# Patient Record
Sex: MALE | Race: WHITE | NOT HISPANIC OR LATINO | Employment: FULL TIME | URBAN - METROPOLITAN AREA
[De-identification: names, ages, dates, MRNs, and addresses within clinical notes are randomized per-mention and may not be internally consistent; named-entity substitution may affect disease eponyms.]

---

## 2021-01-27 ENCOUNTER — IMMUNIZATION (OUTPATIENT)
Dept: NURSING | Facility: CLINIC | Age: 31
End: 2021-01-27
Payer: COMMERCIAL

## 2021-01-27 PROCEDURE — 91300 PR COVID VAC PFIZER DIL RECON 30 MCG/0.3 ML IM: CPT

## 2021-01-27 PROCEDURE — 0001A PR COVID VAC PFIZER DIL RECON 30 MCG/0.3 ML IM: CPT

## 2021-02-17 ENCOUNTER — IMMUNIZATION (OUTPATIENT)
Dept: NURSING | Facility: CLINIC | Age: 31
End: 2021-02-17
Attending: INTERNAL MEDICINE
Payer: COMMERCIAL

## 2021-02-17 PROCEDURE — 91300 PR COVID VAC PFIZER DIL RECON 30 MCG/0.3 ML IM: CPT

## 2021-02-17 PROCEDURE — 0002A PR COVID VAC PFIZER DIL RECON 30 MCG/0.3 ML IM: CPT

## 2021-03-06 ENCOUNTER — HEALTH MAINTENANCE LETTER (OUTPATIENT)
Age: 31
End: 2021-03-06

## 2021-10-09 ENCOUNTER — HEALTH MAINTENANCE LETTER (OUTPATIENT)
Age: 31
End: 2021-10-09

## 2022-03-20 ENCOUNTER — HEALTH MAINTENANCE LETTER (OUTPATIENT)
Age: 32
End: 2022-03-20

## 2022-07-26 ENCOUNTER — LAB (OUTPATIENT)
Dept: LAB | Facility: CLINIC | Age: 32
End: 2022-07-26
Payer: COMMERCIAL

## 2022-07-26 DIAGNOSIS — Z31.440 ENCOUNTER OF MALE FOR TESTING FOR GENETIC DISEASE CARRIER STATUS FOR PROCREATIVE MANAGEMENT: ICD-10-CM

## 2022-07-26 DIAGNOSIS — Z31.440 ENCOUNTER OF MALE FOR TESTING FOR GENETIC DISEASE CARRIER STATUS FOR PROCREATIVE MANAGEMENT: Primary | ICD-10-CM

## 2022-07-26 PROCEDURE — 36415 COLL VENOUS BLD VENIPUNCTURE: CPT

## 2022-07-26 NOTE — PROGRESS NOTES
[facility]  Genetic Counseling Consult    Patient:  Irving Jacob YOB: 1990   Date of Service:  22      Irving accompanied his partner, Afia to her appointment at the [facility] for genetic consultation. The option to pursue carrier screening was discussed today.        Impression/Plan:   1. Irving and Afia elected to pursue expanded carrier screening for 302 conditions through [facility].  Results are expected within 2-4 weeks, and will be available in [facility].  We will contact the couple to discuss the results. They requested that we contact them individually once both results are available and both provided verbal permission for results to be left in their voicemail.     Carrier Screening:   The couple reported that they are of  ancestry:     Cystic fibrosis is an autosomal recessive genetic condition that occurs with increased frequency in individuals of  ancestry and carrier screening for this condition is available.  In addition,  screening in the [facility] includes cystic fibrosis.      Expanded carrier screening for mutations in a large panel of genes associated with autosomal recessive conditions including cystic fibrosis, spinal muscular atrophy, and others, is now available.    We discussed that expanded carrier screening is designed to identify carrier status for conditions that are primarily childhood or adolescent onset. Expanded carrier screening does not evaluate for adult-onset conditions such as hereditary cancer syndromes, dementia/ Alzheimer's disease, or cardiovascular disease risk factors. Additionally, expanded carrier screening is not comprehensive for all known genetic diseases or inherited conditions. This is a screening test, and residual carrier status risk figures will be provided to the patient after results become available. Carrier screening is  not meant to diagnose the patient with a condition, and generally carriers are asymptomatic. However, certain genes may confer increased risks for various health concerns in carriers (DMD, FMR1, etc). Patients are encouraged to share results with their primary care providers to ensure appropriate screening. If we are notified by the performing laboratory of a variant reclassification, the patient will be contacted. We briefly reviewed MARCY (Genetic Information Nondiscrimination Act). MARCY is a federal law that protects individuals from health insurance or employment discrimination based on a genetic test result alone.  There are currently no legal discrimination protections in terms of life insurance, long term care, or disability insurances. We reviewed that this is often less of a concern in terms of carrier screening for conditions that are most often not associated with symptoms when an individual is a carrier, however there are some exceptions.     We reviewed availability of expanded carrier screening through [facility] and different panel sizes. Ayla will report on pseudodeficiency alleles, which are benign variants that are not known to be associated with disease and are not thought to impact the individuals risk to be a carrier for these conditions. However, the presence of pseudodeficiency alleles can exhibit false positive results on biochemical tests such as  screen. [facility] will report the common 5T CFTR allele in isolation.     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 Records of these results were not available for review at time of discussion.        Irving and Afia elected to pursue expanded carrier screening for 302 conditions through [facility]. This includes the three conditions mentioned above given Afia's reported history. We  reviewed benefits and limitations of add-on genes with variable presentation and that if positive, it would be recommended that these are shared with their PCP for appropriate management. Results are expected within 2-4 weeks, and will be available in [facility]. We will contact the couple to discuss the results.     We reviewed the benefits and limitations of this testing.  Screening tests provide a risk assessment specific to the pregnancy for certain fetal chromosome abnormalities, but cannot definitively diagnose or exclude a fetal chromosome abnormality.  Follow-up genetic counseling and consideration of diagnostic testing is recommended with any abnormal screening result.     Diagnostic tests carry inherent risks- including risk of miscarriage- that require careful consideration.  These tests can detect fetal chromosome abnormalities with greater than 99% certainty.  Results can be compromised by maternal cell contamination or mosaicism, and are limited by the resolution of cytogenetic G-banding technology.  There is no screening nor diagnostic test that can detect all forms of birth defects or mental disability.    It was a pleasure to be involved with Irving's care.     Chinyere Cardona MS, [facility]  Licensed Genetic Counselor  [facility]  Maternal Fetal Medicine  Ph: [phone]  [email or web address]             32.8

## 2022-08-05 ENCOUNTER — TELEPHONE (OUTPATIENT)
Dept: MATERNAL FETAL MEDICINE | Facility: CLINIC | Age: 32
End: 2022-08-05

## 2022-08-05 LAB — SCANNED LAB RESULT: ABNORMAL

## 2022-08-05 NOTE — TELEPHONE ENCOUNTER
August 5, 2022    Message to Irving's partner to notify couple of available carrier screening results to determine time when both are together to discuss over the phone as both are carriers for several conditions including one overlapping condition.    Chinyere Cardona MS, [facility]  Licensed Genetic Counselor  [facility]  Maternal Fetal Medicine  Ph: [phone]  [email or web address]

## 2022-08-05 NOTE — TELEPHONE ENCOUNTER
August 5, 2022     I spoke with the couple to review available expanded carrier screening results. Of note, Afia and Irving were BOTH identified to be carriers of hereditary hemochromatosis. They did not overlap on any additional conditions on the panel of 302 conditions through [facility].     Afia was found to be a carrier for the following conditions,    Alpha-1 antitrypsin deficiency SERPINA1 c.1096G>A (p.Hgt800Eid)  This is a highly variable inherited disorder that may cause lung disease and liver disease. Individuals with a single pathogenic variant are typically healthy, but may have a slightly increased risk for lung or liver diseases. Environmental factors, such as smoking, increase the risk of lung disease. Prior to testing, Afia was already aware of her carrier status and this information and had shared that her providers were also aware. Afia s partner was not identified to be a carrier of this condition. This information should be shared with any of their future children s pediatrician/PCP s.    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 MMDs, LGMDs, and DMAT are conditions that cause muscle weakness. Afia s partner was not identified to be a carrier of this condition, reducing the chance for the couple to have an affected child.      Afia and Irving were BOTH identified to be carriers of-  Hereditary hemochromatosis type 1 HFE c.187C>G (p.Hgu48Nnl)  Hereditary hemochromatosis is a condition caused by the body absorbing too much iron leading to excess iron or iron overload. Hemochromatosis due to variants in HFE typically begins in adulthood when iron accumulation progressively develops with age. Males are more likely to have  symptoms at earlier ages due to protective iron loss through menstruation and pregnancy for women. Therefore, symptoms for women are more likely postmenopausal.  Iron overload, if untreated, can lead to diabetes mellitus (due to iron accumulation and damage to the pancreas), hepatomegaly, hepatic cirrhosis, primary liver cancer, and cardiomyopathy. Early treatment such as regular phlebotomy is successful in controlling the accumulation and therefore, preventing or reducing the severity of the symptoms.    The most common genetic variants in HFE are known to have low penetrance, meaning not all individuals with these genetic changes will go on to develop signs or symptoms related to the condition. The specific variant identified in Cesar is a very common, low penetrance variant that is known to contribute to hemochromatosis when present with a second pathogenic allele in HFE and is associated with a lower penetrance than other pathogenic alleles in the HFE gene.   Because Cesar are both carriers of this condition (same variant), there would be a 1/4 or 25% chance for any of the couple s children to inherit both HFE variants. An estimated 1.5% of individuals of  descent who are affected with hemochromatosis are homozygous for this variant, however, penetrance of the homozygous genotype is very low and is associated with variable phenotypes. Genetic testing is not able to clarify or predict phenotype. We reviewed that this information should be shared with any of their future children s pediatrician/PCP s to ensure appropriate evaluation.       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 His partner was not identified to be a carrier of this condition, reducing the chance for the couple to have an affected  child.     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 His partner was not identified to be a carrier of this condition, reducing the chance for the couple to have an affected child.     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 His partner was not identified to be a carrier of this condition, reducing the chance for the couple to have an affected child.     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 His partner was not identified to be a carrier of this condition, reducing the chance for the couple to have an affected child.       The couple's children and siblings have a 50% chance of being carriers for the aforementioned conditions and it is recommended this information be shared with them (besides risk to children for hereditary hemochromatosis). Afia and Irving provided verbal permission for results to be sent to their email. They were encouraged to reach out with any further questions.     Chinyere Cardona MS, [facility]  Licensed Genetic Counselor  [facility]  Maternal Fetal Medicine  Ph: [phone]  [email or web address]

## 2022-09-11 ENCOUNTER — HEALTH MAINTENANCE LETTER (OUTPATIENT)
Age: 32
End: 2022-09-11

## 2023-05-06 ENCOUNTER — HEALTH MAINTENANCE LETTER (OUTPATIENT)
Age: 33
End: 2023-05-06

## 2024-03-06 ENCOUNTER — MEDICAL CORRESPONDENCE (OUTPATIENT)
Dept: SCHEDULING | Facility: CLINIC | Age: 34
End: 2024-03-06
Payer: COMMERCIAL

## 2024-04-21 ENCOUNTER — OFFICE VISIT (OUTPATIENT)
Dept: FAMILY MEDICINE | Facility: CLINIC | Age: 34
End: 2024-04-21
Payer: COMMERCIAL

## 2024-04-21 VITALS
SYSTOLIC BLOOD PRESSURE: 122 MMHG | HEIGHT: 70 IN | OXYGEN SATURATION: 96 % | WEIGHT: 240.7 LBS | BODY MASS INDEX: 34.46 KG/M2 | HEART RATE: 101 BPM | DIASTOLIC BLOOD PRESSURE: 78 MMHG | TEMPERATURE: 98.6 F | RESPIRATION RATE: 16 BRPM

## 2024-04-21 DIAGNOSIS — L30.9 ECZEMA, UNSPECIFIED TYPE: Primary | ICD-10-CM

## 2024-04-21 PROCEDURE — 99213 OFFICE O/P EST LOW 20 MIN: CPT | Performed by: NURSE PRACTITIONER

## 2024-04-21 RX ORDER — TRIAMCINOLONE ACETONIDE 1 MG/G
CREAM TOPICAL 2 TIMES DAILY
Qty: 80 G | Refills: 0 | Status: SHIPPED | OUTPATIENT
Start: 2024-04-21 | End: 2024-06-30

## 2024-04-21 RX ORDER — FERROUS SULFATE 325(65) MG
325 TABLET ORAL
COMMUNITY

## 2024-04-21 RX ORDER — ALBUTEROL SULFATE 0.83 MG/ML
SOLUTION RESPIRATORY (INHALATION)
COMMUNITY
Start: 2022-11-21

## 2024-04-21 RX ORDER — FLUTICASONE PROPIONATE AND SALMETEROL 100; 50 UG/1; UG/1
1 POWDER RESPIRATORY (INHALATION)
COMMUNITY

## 2024-04-21 RX ORDER — ERGOCALCIFEROL 1.25 MG/1
CAPSULE, LIQUID FILLED ORAL
COMMUNITY
Start: 2024-03-16

## 2024-04-21 ASSESSMENT — PAIN SCALES - GENERAL: PAINLEVEL: NO PAIN (0)

## 2024-04-21 NOTE — PROGRESS NOTES
"SUBJECTIVE:  Irving Jacob is a 33 year old adult who presents to the clinic today for a rash.  Onset of rash was 2 week(s) ago.   Rash is gradual onset.  Location of the rash: hand.  Quality/symptoms of rash: itching   Symptoms are moderate and rash seems to be worsening.  Previous history of a similar rash? Yes:     Past Medical History:   Diagnosis Date    Crohn's disease (H) Diagnosed in 2006    Depressive disorder     History of blood transfusion 2007    Uncomplicated asthma      Current Outpatient Medications   Medication Sig Dispense Refill    adalimumab (HUMIRA) 40 MG/0.8ML prefilled syringe kit Inject 40 mg Subcutaneous every 7 days      albuterol (PROAIR RESPICLICK) 108 (90 Base) MCG/ACT inhaler Inhale 2 puffs every 4 hours by inhalation route.      albuterol (PROVENTIL) (2.5 MG/3ML) 0.083% neb solution       DULoxetine HCl (CYMBALTA PO) Take 60 mg by mouth daily      ferrous sulfate (FEROSUL) 325 (65 Fe) MG tablet Take 325 mg by mouth      fluticasone-salmeterol (ADVAIR DISKUS) 100-50 MCG/ACT inhaler Inhale 1 puff into the lungs      Sertraline HCl (ZOLOFT PO) Take 200 mg by mouth daily      TraZODone HCl (DESYREL PO) Take 100 mg by mouth      vitamin D2 (ERGOCALCIFEROL) 18097 units (1250 mcg) capsule        Social History     Tobacco Use    Smoking status: Never    Smokeless tobacco: Not on file   Substance Use Topics    Alcohol use: Not on file       ROS:  Review of systems negative except as stated above.    EXAM:   /78 (BP Location: Right arm, Patient Position: Sitting, Cuff Size: Adult Large)   Pulse 101   Temp 98.6  F (37  C) (Oral)   Resp 16   Ht 1.778 m (5' 10\")   Wt 109.2 kg (240 lb 11.2 oz)   SpO2 96%   BMI 34.54 kg/m    GENERAL: alert, no acute distress.  SKIN: Rash description:    Examination of the rash to the hands bilateral  reveals: Eczema: dry, slightly raised, red patches with mild flaking.     ASSESSMENT:    ICD-10-CM    1. Eczema, unspecified type  L30.9 triamcinolone " (KENALOG) 0.1 % external cream            PLAN:  1) See today's orders.  2) Follow-up with primary clinic if not improving    TASIA Weinberg CNP

## 2024-06-30 ENCOUNTER — OFFICE VISIT (OUTPATIENT)
Dept: FAMILY MEDICINE | Facility: CLINIC | Age: 34
End: 2024-06-30
Payer: COMMERCIAL

## 2024-06-30 VITALS
OXYGEN SATURATION: 96 % | SYSTOLIC BLOOD PRESSURE: 126 MMHG | WEIGHT: 248.9 LBS | TEMPERATURE: 98 F | BODY MASS INDEX: 35.71 KG/M2 | HEART RATE: 92 BPM | DIASTOLIC BLOOD PRESSURE: 77 MMHG

## 2024-06-30 DIAGNOSIS — J45.30 MILD PERSISTENT ASTHMA WITHOUT COMPLICATION: Primary | ICD-10-CM

## 2024-06-30 DIAGNOSIS — L30.9 ECZEMA, UNSPECIFIED TYPE: ICD-10-CM

## 2024-06-30 PROCEDURE — 99214 OFFICE O/P EST MOD 30 MIN: CPT | Performed by: STUDENT IN AN ORGANIZED HEALTH CARE EDUCATION/TRAINING PROGRAM

## 2024-06-30 RX ORDER — TRIAMCINOLONE ACETONIDE 1 MG/G
CREAM TOPICAL 2 TIMES DAILY
Qty: 80 G | Refills: 1 | Status: SHIPPED | OUTPATIENT
Start: 2024-06-30 | End: 2024-09-16

## 2024-06-30 RX ORDER — BUDESONIDE AND FORMOTEROL FUMARATE DIHYDRATE 160; 4.5 UG/1; UG/1
2 AEROSOL RESPIRATORY (INHALATION) 2 TIMES DAILY
Qty: 10.2 G | Refills: 2 | Status: SHIPPED | OUTPATIENT
Start: 2024-06-30

## 2024-06-30 RX ORDER — FLUTICASONE PROPIONATE AND SALMETEROL 100; 50 UG/1; UG/1
1 POWDER RESPIRATORY (INHALATION) EVERY 12 HOURS
Qty: 60 EACH | Refills: 3 | Status: SHIPPED | OUTPATIENT
Start: 2024-06-30

## 2024-06-30 RX ORDER — ALBUTEROL SULFATE 90 UG/1
2 AEROSOL, METERED RESPIRATORY (INHALATION) EVERY 6 HOURS PRN
Qty: 18 G | Refills: 3 | Status: SHIPPED | OUTPATIENT
Start: 2024-06-30

## 2024-06-30 NOTE — PROGRESS NOTES
Assessment & Plan     Mild persistent asthma without complication  Patient's insurance will end tomorrow and needs refills of inhalers. Asthma has been pretty well controlled with Symbicort. It looks like insurance covers Advair from our system but will need to be run through insurance once he gets to the pharmacy. I sent Symbicort in case this is incorrect so they can try to fill that instead. Patient will follow up with primary care.   - fluticasone-salmeterol (ADVAIR) 100-50 MCG/ACT inhaler  Dispense: 60 each; Refill: 3  - albuterol (VENTOLIN HFA) 108 (90 Base) MCG/ACT inhaler  Dispense: 18 g; Refill: 3  - budesonide-formoterol (SYMBICORT) 160-4.5 MCG/ACT Inhaler  Dispense: 10.2 g; Refill: 2    Eczema, unspecified type  Stable, intermittent flares that respond well to treatment with triamcinolone steroid cream as needed.   - triamcinolone (KENALOG) 0.1 % external cream  Dispense: 80 g; Refill: 1         No follow-ups on file.    TASIA Farah [facility]    Leeroy Villatoro is a 33 year old male who presents to clinic today for the following health issues:  Chief Complaint   Patient presents with    Recheck Medication     Refill inhaler. Patient's insurance will end tomorrow and needs refills of inhalers. Asthma has been pretty well controlled with Symbicort.  Also needs refills of eczema medication. Intermittent flares that respond well to treatment with triamcinolone steroid cream as needed.        HPI        Review of Systems  Constitutional, HEENT, cardiovascular, pulmonary, GI, , musculoskeletal, neuro, skin, endocrine and psych systems are negative, except as otherwise noted.      Objective    /77 (BP Location: Right arm, Patient Position: Chair, Cuff Size: Adult Large)   Pulse 92   Temp 98  F (36.7  C) (Oral)   Wt 112.9 kg (248 lb 14.4 oz)   SpO2 96%   BMI 35.71 kg/m    Physical Exam   GENERAL: alert and no distress  RESP: lungs clear to  auscultation   MS: no gross musculoskeletal defects noted, no edema  SKIN: no suspicious lesions or rashes  NEURO: Normal strength and tone, mentation intact and speech normal  PSYCH: mentation appears normal, affect normal/bright

## 2024-07-13 ENCOUNTER — HEALTH MAINTENANCE LETTER (OUTPATIENT)
Age: 34
End: 2024-07-13

## 2024-09-16 ENCOUNTER — OFFICE VISIT (OUTPATIENT)
Dept: FAMILY MEDICINE | Facility: CLINIC | Age: 34
End: 2024-09-16
Payer: COMMERCIAL

## 2024-09-16 VITALS
OXYGEN SATURATION: 98 % | RESPIRATION RATE: 20 BRPM | HEART RATE: 92 BPM | DIASTOLIC BLOOD PRESSURE: 76 MMHG | SYSTOLIC BLOOD PRESSURE: 122 MMHG | TEMPERATURE: 98 F

## 2024-09-16 DIAGNOSIS — L30.9 ECZEMA, UNSPECIFIED TYPE: Primary | ICD-10-CM

## 2024-09-16 DIAGNOSIS — R35.0 URINARY FREQUENCY: ICD-10-CM

## 2024-09-16 DIAGNOSIS — N48.9 PENILE LESION: ICD-10-CM

## 2024-09-16 DIAGNOSIS — Z11.3 ROUTINE SCREENING FOR STI (SEXUALLY TRANSMITTED INFECTION): ICD-10-CM

## 2024-09-16 LAB
ALBUMIN UR-MCNC: NEGATIVE MG/DL
APPEARANCE UR: CLEAR
BILIRUB UR QL STRIP: NEGATIVE
COLOR UR AUTO: YELLOW
GLUCOSE UR STRIP-MCNC: NEGATIVE MG/DL
HGB UR QL STRIP: NEGATIVE
KETONES UR STRIP-MCNC: NEGATIVE MG/DL
LEUKOCYTE ESTERASE UR QL STRIP: NEGATIVE
NITRATE UR QL: NEGATIVE
PH UR STRIP: 6.5 [PH] (ref 5–8)
SP GR UR STRIP: <=1.005 (ref 1–1.03)
UROBILINOGEN UR STRIP-ACNC: 0.2 E.U./DL

## 2024-09-16 PROCEDURE — 87491 CHLMYD TRACH DNA AMP PROBE: CPT | Performed by: NURSE PRACTITIONER

## 2024-09-16 PROCEDURE — 87591 N.GONORRHOEAE DNA AMP PROB: CPT | Performed by: NURSE PRACTITIONER

## 2024-09-16 PROCEDURE — 99214 OFFICE O/P EST MOD 30 MIN: CPT | Performed by: NURSE PRACTITIONER

## 2024-09-16 PROCEDURE — 81003 URINALYSIS AUTO W/O SCOPE: CPT | Performed by: NURSE PRACTITIONER

## 2024-09-16 RX ORDER — TRIAMCINOLONE ACETONIDE 1 MG/G
CREAM TOPICAL 2 TIMES DAILY
Qty: 80 G | Refills: 0 | Status: SHIPPED | OUTPATIENT
Start: 2024-09-16

## 2024-09-16 ASSESSMENT — ENCOUNTER SYMPTOMS
CHILLS: 0
FEVER: 0
DYSURIA: 0

## 2024-09-16 NOTE — PATIENT INSTRUCTIONS
Eczema - Can try 1% hydrocortisone or triamcinolone with FREQUENT use of emollients like aqhaquor or Vaseline (3-4 times daily or more).      Try eczema cleanser soap like Cetaphil for routine hand washing.      Dermatology for penile lesion (likely wart)      Your UA today is normal.  No infection.    Other things that can cause urinary odor besides infection include  asparagus, fish, onions, or garlic.      Make sure to drink at least 8 large glasses of water daily.     Recheck with your doctor if the symptoms persist or worsen such as burning with urination or fevers

## 2024-09-16 NOTE — PROGRESS NOTES
Assessment & Plan     Urinary frequency    - UA Macroscopic with reflex to Microscopic and Culture - Clinic Collect    Routine screening for STI (sexually transmitted infection)    - Chlamydia & Gonorrhea by PCR, GICH/Range - Clinic Collect    Penile lesion    - Adult Dermatology  Referral    Eczema, unspecified type    - triamcinolone (KENALOG) 0.1 % external cream  Dispense: 80 g; Refill: 0     Discussed his bilateral intermittent hand eczema which usually improves with triamcinolone.  Discussed pulsed dosing of steroids for no longer than 2 weeks at a time.  Emollients frequently, cleanser versus soap.  See AVS for details.    Single penile lesion consistent with genital wart.  Has had these previously.  Dermatology referral placed.  Patient has previously had these frozen off with cryotherapy.    Urinary odor without infection today.  Going more frequently and large amounts.  No glucose in the urine.  UA is negative for infection.  Denies risk factors for STDs.  Explained could be due to, for instance, concentrated urine or foods known to cause this.  Some examples given.  See AVS for details.  Recheck with PCP if not better.  Patient plans on scheduling an a physical to go over everything      28 minutes spent by me on the date of the encounter doing chart review, review of test results, patient visit, and documentation         Return in about 2 weeks (around 9/30/2024).    Kimberly [facility] KAYLEE Villatoro is a 33 year old male who presents to clinic today for the following health issues:  Chief Complaint   Patient presents with    STD     X 1 month, Odor in urine, always tired, has had genital warts in the past and another one popped up recently, urinary frequency, gained a lot of weight in the past year thinks this is linked to the issue today.        HPI    Persistent intermittent eczema on bilateral hands.   Has been using 1x per day triamcinolone.     Has been using off and on since 6/24/24.  Is out and is requesting a refill.    Just started using emollients.  Has not been using an eczema cleanser.    +Urinary odor and some cloudy urine at times. No discharge. No new sexual partners for years.  No dysuria.  Feels like he is voiding more frequently.  Is worried about prediabetes.  Does plan on scheduling a physical coming up    Has a presumed genital wart on the penis.  Has had these before frozen off at dermatology, but needs a new dermatology clinic due to insurance.          Review of Systems   Constitutional:  Negative for chills and fever.   Genitourinary:  Negative for dysuria.           Objective    /76 (BP Location: Right arm, Patient Position: Sitting, Cuff Size: Adult Regular)   Pulse 92   Temp 98  F (36.7  C) (Oral)   Resp 20   SpO2 98%   Physical Exam  Constitutional:       Appearance: Normal appearance.   Pulmonary:      Effort: Pulmonary effort is normal.   Genitourinary:     Comments: Single pinkish, raised lesion located on the shaft of the penis visually consistent with a genital wart  Neurological:      Mental Status: He is alert.   Psychiatric:         Mood and Affect: Mood normal.

## 2024-09-17 LAB
C TRACH DNA SPEC QL PROBE+SIG AMP: NEGATIVE
N GONORRHOEA DNA SPEC QL NAA+PROBE: NEGATIVE

## 2025-03-29 ENCOUNTER — OFFICE VISIT (OUTPATIENT)
Dept: URGENT CARE | Facility: URGENT CARE | Age: 35
End: 2025-03-29
Payer: COMMERCIAL

## 2025-03-29 VITALS
RESPIRATION RATE: 16 BRPM | SYSTOLIC BLOOD PRESSURE: 128 MMHG | OXYGEN SATURATION: 94 % | HEIGHT: 70 IN | DIASTOLIC BLOOD PRESSURE: 80 MMHG | WEIGHT: 268 LBS | TEMPERATURE: 98.9 F | BODY MASS INDEX: 38.37 KG/M2 | HEART RATE: 95 BPM

## 2025-03-29 DIAGNOSIS — S81.802A OPEN WOUND OF LEFT LOWER LEG, INITIAL ENCOUNTER: Primary | ICD-10-CM

## 2025-03-29 DIAGNOSIS — L30.9 ECZEMA, UNSPECIFIED TYPE: ICD-10-CM

## 2025-03-29 PROCEDURE — 3074F SYST BP LT 130 MM HG: CPT | Performed by: FAMILY MEDICINE

## 2025-03-29 PROCEDURE — 99213 OFFICE O/P EST LOW 20 MIN: CPT | Performed by: FAMILY MEDICINE

## 2025-03-29 PROCEDURE — 3079F DIAST BP 80-89 MM HG: CPT | Performed by: FAMILY MEDICINE

## 2025-03-29 RX ORDER — TRIAMCINOLONE ACETONIDE 1 MG/G
CREAM TOPICAL 2 TIMES DAILY
Qty: 80 G | Refills: 0 | Status: SHIPPED | OUTPATIENT
Start: 2025-03-29

## 2025-03-29 RX ORDER — USTEKINUMAB 90 MG/ML
90 INJECTION, SOLUTION SUBCUTANEOUS
COMMUNITY
Start: 2024-12-18

## 2025-03-29 NOTE — PROGRESS NOTES
"      (I02.821E) Open wound of left lower leg, initial encounter  (primary encounter diagnosis)  Comment:     Open 1 cm wound medial portion left leg just above the ankle with some clear, watery drainage.  May have begun in an area which was scratched due to eczema.  Did not appear to be infected currently.    Plan:   Advised to clean with soap and water 2-3 times daily.  Dressed with gauze and wrap in place.  Observe.  Have a follow-up check with primary care.      (L30.9) Eczema, unspecified type  Comment: A refill.  Plan: triamcinolone (KENALOG) 0.1 % external cream            CHIEF COMPLAINT    Check wound lower left leg.      HISTORY    This 34-year-old man has had a problem with a nonhealing wound for about a month.    He says he has had some eczema down in his lower leg and was apparently scratching the area.  Right now he has not open weeping wound.  He started wrapping this with a dressing in the last 2 or 3 days.    He does have Crohn's disease and is on immunosuppressive medication.      REVIEW OF SYSTEMS    No fever.  No breathing problems.  No chest pain.  No abdominal pain.      EXAM  /80   Pulse 95   Temp 98.9  F (37.2  C)   Resp 16   Ht 1.778 m (5' 10\")   Wt 121.6 kg (268 lb)   SpO2 94%   BMI 38.45 kg/m      Large man, NAD.  Nonlabored breathing.  Cardiac regular.  Lower extremities 1+ edema.  Left leg -a 1 cm open wound with granulating base is present medial left leg just proximal to the ankle.  There is clear, watery discharge, a small amount.  No redness or tenderness, no purulent drainage.    This area was washed and gauze dressing applied wrapped in place with 4 inch roller gauze.  "

## 2025-03-29 NOTE — PATIENT INSTRUCTIONS
Clean 2-3 times daily - soap and water.    Dress with guaze and wrap in place.    Follow up with primary care.

## 2025-05-18 ENCOUNTER — OFFICE VISIT (OUTPATIENT)
Dept: URGENT CARE | Facility: URGENT CARE | Age: 35
End: 2025-05-18
Payer: COMMERCIAL

## 2025-05-18 ENCOUNTER — HOSPITAL ENCOUNTER (OUTPATIENT)
Dept: GENERAL RADIOLOGY | Facility: HOSPITAL | Age: 35
Discharge: HOME | End: 2025-05-18
Attending: PHYSICIAN ASSISTANT | Admitting: PHYSICIAN ASSISTANT
Payer: COMMERCIAL

## 2025-05-18 VITALS
SYSTOLIC BLOOD PRESSURE: 128 MMHG | RESPIRATION RATE: 18 BRPM | HEART RATE: 107 BPM | BODY MASS INDEX: 36.88 KG/M2 | DIASTOLIC BLOOD PRESSURE: 73 MMHG | TEMPERATURE: 98.3 F | OXYGEN SATURATION: 97 % | WEIGHT: 257 LBS

## 2025-05-18 DIAGNOSIS — J01.90 ACUTE NON-RECURRENT SINUSITIS, UNSPECIFIED LOCATION: ICD-10-CM

## 2025-05-18 DIAGNOSIS — R05.1 ACUTE COUGH: Primary | ICD-10-CM

## 2025-05-18 DIAGNOSIS — J45.21 MILD INTERMITTENT ASTHMA WITH EXACERBATION: ICD-10-CM

## 2025-05-18 LAB
BASOPHILS # BLD AUTO: 0 10E3/UL (ref 0–0.2)
BASOPHILS NFR BLD AUTO: 1 %
EOSINOPHIL # BLD AUTO: 0.2 10E3/UL (ref 0–0.7)
EOSINOPHIL NFR BLD AUTO: 5 %
IMM GRANULOCYTES # BLD: 0 10E3/UL
IMM GRANULOCYTES NFR BLD: 0 %
LYMPHOCYTES # BLD AUTO: 1.3 10E3/UL (ref 0.8–5.3)
LYMPHOCYTES NFR BLD AUTO: 27 %
MONOCYTES # BLD AUTO: 0.5 10E3/UL (ref 0–1.3)
MONOCYTES NFR BLD AUTO: 11 %
NEUTROPHILS # BLD AUTO: 2.8 10E3/UL (ref 1.6–8.3)
NEUTROPHILS NFR BLD AUTO: 57 %
WBC # BLD AUTO: 4.9 10E3/UL (ref 4–11)

## 2025-05-18 PROCEDURE — 36415 COLL VENOUS BLD VENIPUNCTURE: CPT | Performed by: PHYSICIAN ASSISTANT

## 2025-05-18 PROCEDURE — 85048 AUTOMATED LEUKOCYTE COUNT: CPT | Performed by: PHYSICIAN ASSISTANT

## 2025-05-18 PROCEDURE — 71046 X-RAY EXAM CHEST 2 VIEWS: CPT

## 2025-05-18 PROCEDURE — 3078F DIAST BP <80 MM HG: CPT | Performed by: PHYSICIAN ASSISTANT

## 2025-05-18 PROCEDURE — 85004 AUTOMATED DIFF WBC COUNT: CPT | Performed by: PHYSICIAN ASSISTANT

## 2025-05-18 PROCEDURE — 99214 OFFICE O/P EST MOD 30 MIN: CPT | Performed by: PHYSICIAN ASSISTANT

## 2025-05-18 PROCEDURE — 3074F SYST BP LT 130 MM HG: CPT | Performed by: PHYSICIAN ASSISTANT

## 2025-05-18 RX ORDER — DOXYCYCLINE 100 MG/1
100 CAPSULE ORAL 2 TIMES DAILY
Qty: 10 CAPSULE | Refills: 0 | Status: SHIPPED | OUTPATIENT
Start: 2025-05-18 | End: 2025-05-23

## 2025-05-18 RX ORDER — PREDNISONE 20 MG/1
20 TABLET ORAL 2 TIMES DAILY
Qty: 10 TABLET | Refills: 0 | Status: SHIPPED | OUTPATIENT
Start: 2025-05-18 | End: 2025-05-23

## 2025-05-18 NOTE — PROGRESS NOTES
Urgent Care Clinic Visit    Chief Complaint   Patient presents with    Cough     Dry cough for few weeks. Possible allergies. Runny nose. Throat pain from coughing too much. Headache.               5/18/2025     2:50 PM   Additional Questions   Roomed by Harshil Wilson MA   Accompanied by Self         Harshil Wilson MA on 5/18/2025 at 2:50 PM

## 2025-05-18 NOTE — PROGRESS NOTES
Acute cough  - WBC with Diff  - XR Chest 2 Views  - doxycycline hyclate (VIBRAMYCIN) 100 MG capsule; Take 1 capsule (100 mg) by mouth 2 times daily for 5 days.  - predniSONE (DELTASONE) 20 MG tablet; Take 1 tablet (20 mg) by mouth 2 times daily for 5 days.    Acute non-recurrent sinusitis, unspecified location    Mild intermittent asthma with exacerbation  - predniSONE (DELTASONE) 20 MG tablet; Take 1 tablet (20 mg) by mouth 2 times daily for 5 days.    General Tips for All Ages:    Nasal Irrigation:  Why: Clears nasal passages and reduces congestion.  What to Do:  Use a saline nasal spray or a neti pot to rinse your nasal passages.    Warm Compress:  Why: Eases sinus pain and promotes drainage.  What to Do:  Apply a warm compress over your sinuses for relief.    Stay Hydrated:  Why: Hydration helps thin mucus.  What to Do:  Drink plenty of water, herbal teas, or clear broths.    For Children (Under 12 Years):    OTC Saline Nasal Drops:  Why: Clears nasal passages gently.  What to Do:  Administer saline drops as directed by your child's pediatrician.    OTC Acetaminophen or Ibuprofen (if approved by pediatrician):  Why: Manages pain and reduces fever.  What to Do:  Give the recommended dose as per your child's pediatrician.    For Adolescents (12-17 Years):    OTC Decongestants (if approved by healthcare provider):  Why: Helps reduce nasal congestion.  What to Do:  Use decongestant medications cautiously, following your healthcare provider's advice.    Stay Active:  Why: Physical activity can aid sinus drainage.  What to Do:  Engage in light exercise as tolerated.    For Adults (18 Years and Older):    OTC Nasal Decongestant Sprays (use for a short duration):  Why: Provides quick relief for nasal congestion.  What to Do:  Use as directed on the package and for a limited time to avoid rebound congestion.    OTC Pain Relievers (Acetaminophen or Ibuprofen):  Why: Manages pain and reduces inflammation.  What to  Do:  Take the recommended dose as directed.    All Ages:    Humidifier Use:  Why: Adds moisture to the air, easing congestion.  What to Do:  Use a humidifier in your room, especially while sleeping.    Elevate Your Head:  Why: Helps with sinus drainage.  What to Do:  Use an extra pillow to elevate your head while sleeping.    Avoid Irritants:  Why: Prevents worsening of symptoms.  What to Do:  Stay away from tobacco smoke and other irritants.    Warm Salt Gargle:  Why: Soothes a sore throat.  What to Do:  Gargle with warm salt water several times a day.    Rest and Relaxation:  Why: Aids in recovery.  What to Do:  Get adequate rest to allow your body to heal.    Seek Medical Attention:    Patient advised to return to clinic for reevaluation (either UC or PCP) if symptoms do not improve in 7 days. Patient educated on red flag symptoms and asked to go directly to the ED if these symptoms present themselves.     Remember, each case is unique, and it's essential to tailor these recommendations to your specific situation. If you have concerns or need further guidance, don't hesitate to reach out to your healthcare provider.    Wishing you a speedy recovery!      KETURAH Martinez [facility]    Subjective   34 year old who presents to clinic today for the following health issues:    Cough       HPI     Acute Illness  Acute illness concerns: Cough, headache   Onset/Duration: 2 weeks   Symptoms:  Fever: No  Chills/Sweats: YES  Headache (location?): YES  Sinus Pressure: YES  Conjunctivitis:  No  Ear Pain: YES: bilateral  Rhinorrhea: YES  Congestion: YES  Sore Throat: No  Cough: YES- Albuterol inhaler and Symbicort- Also tried using nebulizer with albuterol   Wheeze: No but having some shortness of breath   Decreased Appetite: No  Nausea: No  Vomiting: No  Diarrhea: No  Dysuria/Freq.: No  Dysuria or Hematuria: No  Fatigue/Achiness: Fatigue but patient has a 1 year old who is not sleeping well    Sick/Strep Exposure: Child has been unwell  Therapies tried and outcome: Robitussin - Tylenol     Review of Systems   Review of Systems   See HPI    Objective    Temp: 98.3  F (36.8  C) Temp src: Tympanic BP: 128/73 Pulse: 107   Resp: 18 SpO2: 97 %       Physical Exam   Physical Exam  Constitutional:       General: He is not in acute distress.     Appearance: Normal appearance. He is normal weight. He is not ill-appearing, toxic-appearing or diaphoretic.   HENT:      Head: Normocephalic and atraumatic.      Right Ear: Tympanic membrane, ear canal and external ear normal. There is no impacted cerumen.      Left Ear: Tympanic membrane, ear canal and external ear normal. There is no impacted cerumen.      Nose: Congestion and rhinorrhea present.      Right Sinus: Maxillary sinus tenderness and frontal sinus tenderness present.      Left Sinus: Maxillary sinus tenderness and frontal sinus tenderness present.      Mouth/Throat:      Mouth: Mucous membranes are moist.      Pharynx: Oropharynx is clear. No oropharyngeal exudate or posterior oropharyngeal erythema.   Cardiovascular:      Rate and Rhythm: Normal rate and regular rhythm.      Pulses: Normal pulses.      Heart sounds: Normal heart sounds. No murmur heard.     No friction rub. No gallop.   Pulmonary:      Effort: Pulmonary effort is normal. No respiratory distress.      Breath sounds: No stridor. Wheezing present. No rhonchi or rales.   Chest:      Chest wall: No tenderness.   Lymphadenopathy:      Cervical: No cervical adenopathy.   Neurological:      Mental Status: He is alert.   Psychiatric:         Mood and Affect: Mood normal.         Behavior: Behavior normal.         Thought Content: Thought content normal.         Judgment: Judgment normal.          Results for orders placed or performed in visit on 05/18/25 (from the past 24 hours)   WBC with Diff    Narrative    The following orders were created for panel order WBC with Diff.  Procedure                                Abnormality         Status                     ---------                               -----------         ------                     WBC and Differential[[id number]]                            Final result                 Please view results for these tests on the individual orders.   WBC and Differential   Result Value Ref Range    WBC Count 4.9 4.0 - 11.0 10e3/uL    % Neutrophils 57 %    % Lymphocytes 27 %    % Monocytes 11 %    % Eosinophils 5 %    % Basophils 1 %    % Immature Granulocytes 0 %    Absolute Neutrophils 2.8 1.6 - 8.3 10e3/uL    Absolute Lymphocytes 1.3 0.8 - 5.3 10e3/uL    Absolute Monocytes 0.5 0.0 - 1.3 10e3/uL    Absolute Eosinophils 0.2 0.0 - 0.7 10e3/uL    Absolute Basophils 0.0 0.0 - 0.2 10e3/uL    Absolute Immature Granulocytes 0.0 <=0.4 10e3/uL   XR Chest 2 Views    Narrative    EXAM: XR CHEST 2 VIEWS  LOCATION: [facility]  DATE: 5/18/2025    INDICATION:  Acute cough  COMPARISON: 12/31/2024      Impression    IMPRESSION: Patchy reticulonodular opacities in the left upper lobe suggestive of infection. Mild bronchial wall thickening. No effusions or pneumothorax. Heart size is normal.

## 2025-07-19 ENCOUNTER — HEALTH MAINTENANCE LETTER (OUTPATIENT)
Age: 35
End: 2025-07-19

## 2025-08-03 ENCOUNTER — OFFICE VISIT (OUTPATIENT)
Dept: URGENT CARE | Facility: URGENT CARE | Age: 35
End: 2025-08-03
Payer: COMMERCIAL

## 2025-08-03 VITALS
DIASTOLIC BLOOD PRESSURE: 72 MMHG | TEMPERATURE: 98.3 F | WEIGHT: 255 LBS | SYSTOLIC BLOOD PRESSURE: 118 MMHG | BODY MASS INDEX: 36.59 KG/M2 | HEART RATE: 114 BPM | RESPIRATION RATE: 20 BRPM | OXYGEN SATURATION: 97 %

## 2025-08-03 DIAGNOSIS — L30.9 ECZEMA, UNSPECIFIED TYPE: ICD-10-CM

## 2025-08-03 DIAGNOSIS — J45.30 MILD PERSISTENT ASTHMA WITHOUT COMPLICATION: ICD-10-CM

## 2025-08-03 DIAGNOSIS — Z76.0 ENCOUNTER FOR MEDICATION REFILL: Primary | ICD-10-CM

## 2025-08-03 RX ORDER — BUDESONIDE AND FORMOTEROL FUMARATE DIHYDRATE 160; 4.5 UG/1; UG/1
2 AEROSOL RESPIRATORY (INHALATION) 2 TIMES DAILY
Qty: 10.2 G | Refills: 0 | Status: SHIPPED | OUTPATIENT
Start: 2025-08-03 | End: 2025-08-03

## 2025-08-03 RX ORDER — TRIAMCINOLONE ACETONIDE 1 MG/G
CREAM TOPICAL 2 TIMES DAILY
Qty: 80 G | Refills: 0 | Status: SHIPPED | OUTPATIENT
Start: 2025-08-03

## 2025-08-03 RX ORDER — BUDESONIDE AND FORMOTEROL FUMARATE DIHYDRATE 160; 4.5 UG/1; UG/1
2 AEROSOL RESPIRATORY (INHALATION) 2 TIMES DAILY
Qty: 10.2 G | Refills: 0 | Status: SHIPPED | OUTPATIENT
Start: 2025-08-03

## 2025-08-03 RX ORDER — BETAMETHASONE DIPROPIONATE 0.5 MG/G
CREAM TOPICAL 2 TIMES DAILY PRN
Qty: 50 G | Refills: 0 | Status: SHIPPED | OUTPATIENT
Start: 2025-08-03 | End: 2025-08-03

## 2025-08-03 RX ORDER — USTEKINUMAB 90 MG/ML
INJECTION, SOLUTION SUBCUTANEOUS
COMMUNITY
Start: 2025-07-31

## 2025-08-03 RX ORDER — LISDEXAMFETAMINE DIMESYLATE 30 MG/1
30 CAPSULE ORAL DAILY
COMMUNITY

## 2025-08-03 RX ORDER — ALBUTEROL SULFATE 90 UG/1
2 INHALANT RESPIRATORY (INHALATION) EVERY 6 HOURS PRN
Qty: 18 G | Refills: 0 | Status: SHIPPED | OUTPATIENT
Start: 2025-08-03